# Patient Record
Sex: FEMALE | Race: WHITE | NOT HISPANIC OR LATINO | Employment: FULL TIME | URBAN - NONMETROPOLITAN AREA
[De-identification: names, ages, dates, MRNs, and addresses within clinical notes are randomized per-mention and may not be internally consistent; named-entity substitution may affect disease eponyms.]

---

## 2017-03-24 ENCOUNTER — OFFICE VISIT (OUTPATIENT)
Dept: SURGERY | Facility: CLINIC | Age: 57
End: 2017-03-24

## 2017-03-24 VITALS
HEIGHT: 68 IN | SYSTOLIC BLOOD PRESSURE: 160 MMHG | WEIGHT: 259 LBS | DIASTOLIC BLOOD PRESSURE: 90 MMHG | BODY MASS INDEX: 39.25 KG/M2 | TEMPERATURE: 97.6 F

## 2017-03-24 DIAGNOSIS — Z12.11 SCREENING FOR COLON CANCER: Primary | ICD-10-CM

## 2017-03-24 DIAGNOSIS — K82.8 BILIARY DYSKINESIA: ICD-10-CM

## 2017-03-24 PROCEDURE — 99244 OFF/OP CNSLTJ NEW/EST MOD 40: CPT | Performed by: SURGERY

## 2017-03-24 RX ORDER — METOPROLOL SUCCINATE 50 MG/1
TABLET, EXTENDED RELEASE ORAL
Refills: 5 | COMMUNITY
Start: 2017-02-25

## 2017-03-24 RX ORDER — HYDROCODONE BITARTRATE AND ACETAMINOPHEN 5; 325 MG/1; MG/1
TABLET ORAL
Refills: 0 | COMMUNITY
Start: 2017-01-12

## 2017-03-24 RX ORDER — ALPRAZOLAM 1 MG/1
1 TABLET ORAL 2 TIMES DAILY PRN
COMMUNITY

## 2017-03-24 RX ORDER — LISDEXAMFETAMINE DIMESYLATE 70 MG/1
CAPSULE ORAL
Refills: 0 | COMMUNITY
Start: 2017-03-06

## 2017-03-24 NOTE — PROGRESS NOTES
Reason for Consultation: Colonoscopy    Chief complaint : I'm having pain  Chief Complaint   Patient presents with   • Colonoscopy     Last colonoscopy 14 years ago. History of polyp. Denies bloody stool, diarrhea or constipation.   • Abdominal Pain     Rt back pain that radiates to the ruq. Hida Scan 3% ejection fraction. Denies nausea and vomiting.       Subjective .     History of present illness:  I saw the patient in the office today as a consultation for evaluation and treatment of flank pain.  She states that this has been occurring for the last 5-6 years.  It is getting worse.  He can be sharp.  Radiates to the right upper quadrant.  She had a HIDA scan which was abnormal and did cause her to have worsening pain over the next few days.  She however does not recall having pain immediately after the Kinevac injection.  Food does not cause her any aggravating symptoms.  Activity sometimes can make it worse.  No nausea or vomiting.  No constipation or diarrhea.    Review of Systems   Constitutional: Negative for chills, fever and unexpected weight change.   HENT: Negative for hearing loss, trouble swallowing and voice change.    Eyes: Negative for visual disturbance.   Respiratory: Negative for apnea, cough, chest tightness, shortness of breath and wheezing.    Cardiovascular: Negative for chest pain, palpitations and leg swelling.   Gastrointestinal: Positive for abdominal pain. Negative for abdominal distention, anal bleeding, blood in stool, constipation, diarrhea, nausea, rectal pain and vomiting.   Endocrine: Negative for cold intolerance and heat intolerance.   Genitourinary: Negative for difficulty urinating, dysuria and flank pain.   Musculoskeletal: Positive for back pain. Negative for gait problem.   Skin: Negative for color change, rash and wound.   Neurological: Negative for dizziness, syncope, speech difficulty, weakness, light-headedness, numbness and headaches.   Hematological: Negative for  adenopathy. Does not bruise/bleed easily.   Psychiatric/Behavioral: Negative for confusion. The patient is not nervous/anxious.        History  Past Medical History:   Diagnosis Date   • Hypertension        Past Surgical History:   Procedure Laterality Date   • OOPHORECTOMY Left    • TONSILLECTOMY         Family History   Problem Relation Age of Onset   • Cancer Mother      skin   • Hypertension Father        Social History   Substance Use Topics   • Smoking status: Never Smoker   • Smokeless tobacco: Never Used   • Alcohol use Yes           Objective     Vital Signs   Temp:  [97.6 °F (36.4 °C)] 97.6 °F (36.4 °C)  BP: (160)/(90) 160/90    Physical Exam:     General Appearance:    Alert, cooperative, in no acute distress   Head:    Normocephalic, without obvious abnormality, atraumatic   Eyes:            Lids and lashes normal, conjunctivae and sclerae normal, no   icterus, no pallor, corneas clear, PERRL   Ears:    Ears appear intact with no abnormalities noted   Throat:   No oral lesions, no thrush, oral mucosa moist   Neck:   No adenopathy, supple, trachea midline, no thyromegaly,  no JVD   Lungs:     Clear to auscultation,respirations regular, even and                  unlabored    Heart:    Regular rhythm and normal rate, normal S1 and S2, no            murmur   Abdomen:     no masses, no organomegaly, soft non-tender, non-distended, no guarding, there is evidence of right upper quadrant tenderness.  Obese.     Extremities:   Moves all extremities well, no edema, no cyanosis, no             redness   Skin:   No bleeding, bruising or rash   Lymph nodes:   No palpable adenopathy in the neck    Neurologic:   Cranial nerves 2 - 12 grossly intact, sensation intact   Psychiatric: No evidence of depression or anxiety        Results Review:   I reviewed the patient's new clinical results. HIDA scan and ultrasound and laboratory studies were reviewed.      Assessment/Plan : Biliary dyskinesia: I clearly explained to her  that removing the gallbladder may not help her symptoms in at least 10-15% of the time.  She does wish to have her gallbladder removed in hopes that this will alleviate her pain.  She understands this procedure as well as the risks of bleeding, infection, bile leak, ductal injury, bowel injury, stability of converting to an open procedure etc. Patient understands all of the above and she wishes to proceed with a laparoscopic cholecystectomy.    Screening colonoscopy:  I recommend a colonoscopy for further evaluation. The procedure was explained as well as the risks which include but are not limited to bleeding, infection, perforation, abdominal pain etc. The patient understands these risks and the procedure and wishes to proceed.        Kenny Lisa MD  03/24/17

## 2017-05-12 ENCOUNTER — OUTSIDE FACILITY SERVICE (OUTPATIENT)
Dept: SURGERY | Facility: CLINIC | Age: 57
End: 2017-05-12

## 2017-05-12 PROCEDURE — 45385 COLONOSCOPY W/LESION REMOVAL: CPT | Performed by: SURGERY

## 2017-05-26 ENCOUNTER — OFFICE VISIT (OUTPATIENT)
Dept: SURGERY | Facility: CLINIC | Age: 57
End: 2017-05-26

## 2017-05-26 VITALS — HEART RATE: 84 BPM | TEMPERATURE: 98 F

## 2017-05-26 DIAGNOSIS — Z48.89 POSTOPERATIVE VISIT: Primary | ICD-10-CM

## 2017-05-26 PROCEDURE — 99024 POSTOP FOLLOW-UP VISIT: CPT | Performed by: SURGERY

## 2017-05-26 RX ORDER — IBUPROFEN 800 MG/1
TABLET ORAL
COMMUNITY
Start: 2017-05-16

## 2020-12-31 ENCOUNTER — IMMUNIZATION (OUTPATIENT)
Dept: VACCINE CLINIC | Facility: HOSPITAL | Age: 60
End: 2020-12-31

## 2020-12-31 PROCEDURE — 0001A: CPT | Performed by: FAMILY MEDICINE

## 2020-12-31 PROCEDURE — 91300 HC SARSCOV02 VAC 30MCG/0.3ML IM: CPT | Performed by: FAMILY MEDICINE

## 2021-01-21 ENCOUNTER — IMMUNIZATION (OUTPATIENT)
Dept: VACCINE CLINIC | Facility: HOSPITAL | Age: 61
End: 2021-01-21

## 2021-01-21 PROCEDURE — 0002A: CPT | Performed by: FAMILY MEDICINE

## 2021-01-21 PROCEDURE — 91300 HC SARSCOV02 VAC 30MCG/0.3ML IM: CPT | Performed by: FAMILY MEDICINE

## 2022-07-07 ENCOUNTER — TELEPHONE (OUTPATIENT)
Dept: SURGERY | Facility: CLINIC | Age: 62
End: 2022-07-07

## 2022-07-13 NOTE — TELEPHONE ENCOUNTER
Attempted to contact patient to schedule for a colonoscopy unable to leave a message pending a call back at this time.

## 2022-07-15 NOTE — TELEPHONE ENCOUNTER
Attempted to reach patient to schedule for a colonoscopy, unable to leave a message, will mail patient a letter and follow up with provider.

## 2022-08-11 ENCOUNTER — HOSPITAL ENCOUNTER (OUTPATIENT)
Dept: HOSPITAL 79 - MAMO | Age: 62
End: 2022-08-11
Attending: PHYSICIAN ASSISTANT
Payer: COMMERCIAL

## 2022-08-11 DIAGNOSIS — Z12.31: Primary | ICD-10-CM
